# Patient Record
Sex: FEMALE | Race: WHITE
[De-identification: names, ages, dates, MRNs, and addresses within clinical notes are randomized per-mention and may not be internally consistent; named-entity substitution may affect disease eponyms.]

---

## 2017-10-29 ENCOUNTER — HEALTH MAINTENANCE LETTER (OUTPATIENT)
Age: 32
End: 2017-10-29

## 2019-02-18 ENCOUNTER — TELEPHONE (OUTPATIENT)
Dept: FAMILY MEDICINE | Facility: CLINIC | Age: 34
End: 2019-02-18

## 2019-02-18 ENCOUNTER — OFFICE VISIT (OUTPATIENT)
Dept: FAMILY MEDICINE | Facility: CLINIC | Age: 34
End: 2019-02-18
Payer: COMMERCIAL

## 2019-02-18 VITALS
HEIGHT: 69 IN | BODY MASS INDEX: 30.21 KG/M2 | OXYGEN SATURATION: 97 % | DIASTOLIC BLOOD PRESSURE: 59 MMHG | TEMPERATURE: 98.4 F | SYSTOLIC BLOOD PRESSURE: 102 MMHG | HEART RATE: 70 BPM | RESPIRATION RATE: 23 BRPM | WEIGHT: 204 LBS

## 2019-02-18 DIAGNOSIS — Z23 NEED FOR DTAP VACCINATION: ICD-10-CM

## 2019-02-18 DIAGNOSIS — M54.50 ACUTE RIGHT-SIDED LOW BACK PAIN WITHOUT SCIATICA: Primary | ICD-10-CM

## 2019-02-18 PROCEDURE — 90471 IMMUNIZATION ADMIN: CPT | Performed by: PHYSICIAN ASSISTANT

## 2019-02-18 PROCEDURE — 99203 OFFICE O/P NEW LOW 30 MIN: CPT | Mod: 25 | Performed by: PHYSICIAN ASSISTANT

## 2019-02-18 PROCEDURE — 90715 TDAP VACCINE 7 YRS/> IM: CPT | Performed by: PHYSICIAN ASSISTANT

## 2019-02-18 RX ORDER — CYCLOBENZAPRINE HCL 10 MG
5-10 TABLET ORAL
Qty: 30 TABLET | Refills: 0 | Status: SHIPPED | OUTPATIENT
Start: 2019-02-18 | End: 2019-02-28

## 2019-02-18 ASSESSMENT — MIFFLIN-ST. JEOR: SCORE: 1699.72

## 2019-02-18 NOTE — PATIENT INSTRUCTIONS
For acute pain, rest and application of heat and ice intermittently as needed and especially after any offending activity (do not sleep on heating pad).   May use over the counter analgesics (NSAIDS (ibuprofen, advil, aleve type products) 400-600 mg every 6 to 8 hours as needed for pain, please take with food ) and/or acetaminophen (Tylenol - 1000 mg three times a day)  Prescription for muscle relaxants (flexeril) at bedtime especially suggested as well.  If no improvement with above ok to send phone call in 5-7 days or with any worsening of symptoms and will send prescription for oral prednisone (steroid) to pharmacy as well.  Discussed longer term treatment plan of prn NSAIDS (ibuprofen, advil, aleve type products) and importance at home stretches/exercise program.    Proper lifting with avoidance of heavy lifting discussed. As pain recedes, begin normal activities slowly as tolerated.    Referral for Physical Therapy updated and consider further imaging vs spine specialists if not improving. Call or return to clinic prn if these symptoms worsen or fail to improve as anticipated with symptomatic care.

## 2019-02-18 NOTE — NURSING NOTE
Screening Questionnaire for Adult Immunization     Are you sick today?   No    Do you have allergies to medications, food or any vaccine?   Yes    Have you ever had a serious reaction after receiving a vaccination?   No    Do you have a long-term health problem with heart disease, lung disease,  asthma, kidney disease, diabetes, anemia, metabolic or blood disease?   No    Do you have cancer, leukemia, AIDS, or any immune system problem?   No    Do you take cortisone, prednisone, other steroids, or anticancer drugs, or  have you had any x-ray (radiation) treatments?   No    Have you had a seizure, brain, or other nervous system problem?   No    During the past year, have you received a transfusion of blood or blood       products, or been given a medicine called immune (gamma) globulin?   No    For women: Are you pregnant or is there a chance you could become         pregnant during the next month?   No    Have you received any vaccinations in the past 4 weeks?   No     Immunization questionnaire was positive for at least one answer.  Notified Plosser.      MNV[facility] doesn't apply on this patient      Per orders of PLosser, injection of tdap given by Ricky Valdivia. Patient instructed to remain in clinic for 20 minutes afterwards, and to report any adverse reaction to me immediately.    Prior to injection verified patient identity using patient's name and date of birth.         Screening performed by Ricky Valdivia, CMA

## 2019-02-18 NOTE — TELEPHONE ENCOUNTER
Patient called and spoke with writer.    Per patient:  1. [facility] [place] does not have Flexeril 10 mg tablets but does have Flexeril 5 mg tablets-was clinic contacted?    Writer informed patient:  1. Clinic not yet contacted and GAIL Goodwin PA-C, no longer in clinic but writer will work on getting order changed    Writer received verbal order from Dr. Gutierres to change to Flexeril 5 mg tablet, sig: Take 1-2 tablets by mouth nightly as needed for muscle spasms.    This verbal order called into [facility] and patient informed.  GARY Larson, BSN, RN

## 2019-08-07 ENCOUNTER — TELEPHONE (OUTPATIENT)
Dept: PLASTIC SURGERY | Facility: CLINIC | Age: 34
End: 2019-08-07

## 2019-08-07 NOTE — TELEPHONE ENCOUNTER
Health Call Center    Phone Message    May a detailed message be left on voicemail: yes    Reason for Call: Patient submitted an online appointment request to see Dr. Moreno for a F to M top surgery consult.  Please contact patient for an appointment    Action Taken: Message routed to:  [facility] ([facility] Pl[facility] Surgery

## 2019-08-08 NOTE — TELEPHONE ENCOUNTER
Left  to have pt call [facility] direct phone number to get scheduled and complete intake.     Saulo ch  Trans care coordinator

## 2019-08-09 ENCOUNTER — PATIENT OUTREACH (OUTPATIENT)
Dept: PLASTIC SURGERY | Facility: CLINIC | Age: 34
End: 2019-08-09

## 2019-08-09 DIAGNOSIS — F64.0 GENDER DYSPHORIA IN ADULT: Primary | ICD-10-CM

## 2019-08-09 NOTE — PROGRESS NOTES
[facility]:  Care Coordination Note     SITUATION   Patient (Cookie, Yesi/Them) is a 33 year old who is receiving support for:  Clinic Care Coordination - Initial (new [facility] pt requesting top consult)  .    BACKGROUND     New [facility] patient self referred requesting top surgery consultation. Pt scheduled 4/21/20 with Nena.     ASSESSMENT     Surgery              [facility] Assessment  [facility] ([facility]) Enrollment: Enrolled(not on hormones)  Patient has a therapist: Yes  Name of therapist: Lizzette Maria in private practice   Letter of support #1: Requested  Surgery being considered: Yes  Mastectomy: Yes    Pt reports smoking but knows they will need to stop all nicotine prior to surgery.   No Diabetes.       PLAN          Nursing Interventions:   [facility] program and services discussed with patient. [facility] referral placed. [facility] assessment completed. Process for accessing surgery discussed including: [facility] standards of care, Letters of support, [place] insurance process, surgery scheduling, and approximate timeline. Pt questions answered. Registration completed & reviewed; scheduling process discussed & completed, as necessary.     More than 50% of the time was used to educate patient on medical & surgical process.     Follow-up plan:  Pt to work with therapist to obtain a letter of support and bring to consultation.        Saulo Rice

## 2020-03-01 ENCOUNTER — HEALTH MAINTENANCE LETTER (OUTPATIENT)
Age: 35
End: 2020-03-01

## 2020-03-31 NOTE — TELEPHONE ENCOUNTER
FUTURE VISIT INFORMATION      FUTURE VISIT INFORMATION:    Date: 4/21/20    Time: 9:00am    [place] [facility] [place]  REFERRAL INFORMATION:    Referring provider:  self    Referring providers clinic:  N/A    Reason for visit/diagnosis  top consult    RECORDS REQUESTED FROM:       No recs to collect

## 2020-04-21 ENCOUNTER — PRE VISIT (OUTPATIENT)
Dept: SURGERY | Facility: CLINIC | Age: 35
End: 2020-04-21

## 2020-12-14 ENCOUNTER — HEALTH MAINTENANCE LETTER (OUTPATIENT)
Age: 35
End: 2020-12-14

## 2021-04-17 ENCOUNTER — HEALTH MAINTENANCE LETTER (OUTPATIENT)
Age: 36
End: 2021-04-17

## 2021-10-02 ENCOUNTER — HEALTH MAINTENANCE LETTER (OUTPATIENT)
Age: 36
End: 2021-10-02

## 2022-05-14 ENCOUNTER — HEALTH MAINTENANCE LETTER (OUTPATIENT)
Age: 37
End: 2022-05-14

## 2022-09-03 ENCOUNTER — HEALTH MAINTENANCE LETTER (OUTPATIENT)
Age: 37
End: 2022-09-03

## 2023-06-02 ENCOUNTER — HEALTH MAINTENANCE LETTER (OUTPATIENT)
Age: 38
End: 2023-06-02